# Patient Record
Sex: FEMALE | Race: WHITE
[De-identification: names, ages, dates, MRNs, and addresses within clinical notes are randomized per-mention and may not be internally consistent; named-entity substitution may affect disease eponyms.]

---

## 2020-08-27 ENCOUNTER — HOSPITAL ENCOUNTER (OUTPATIENT)
Dept: HOSPITAL 62 - RAD | Age: 85
End: 2020-08-27
Attending: FAMILY MEDICINE
Payer: MEDICARE

## 2020-08-27 DIAGNOSIS — M19.011: Primary | ICD-10-CM

## 2020-08-27 PROCEDURE — 64640 INJECTION TREATMENT OF NERVE: CPT

## 2020-08-27 NOTE — OPERATIVE REPORT
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


     


POSTOPERATIVE DIAGNOSIS:Osteoarthritis right shoulder M19.011





PROCEDURE: 1. Radiofrequency Ablation of Cutaneous branches of Scapular Nerve


                          2. Cutaneous branch Suprascapular Nerve


         3. Cutaneous branch Lateral Pectoral


DATE OF PROCEDURE: 8/27/20


ANESTHESIA: Local


COMPLICATIONS: None


CONSENT: A full description of the procedure was provided including benefits as 

well as possible complications. All questions were answered and informed consent

was given and signed. ASA guidelines for fasting were verified prior to 

sedation. 


PROCEDURE IN DETAIL


The patient was brought into the fluoroscopy suite and carefully assisted into 

the prone position on the fluoroscopy table and allowed to adjust to a position 

of comfort. A grounding pad was placed on the right thigh. The low back and 

buttocks were widely prepped with a chloraprep solution, allowed to air dry and 

draped in standard sterile surgical fashion. Local anesthesia was provided by 12

mL of 1 % lidocaine delivered with a 25 g needle.


 


PROCEDURE #1: Radiofrequency Ablation of Cutaneous branch of scapular nerve. 


A 17g 75mm radiofrequency introducer needle was placed to the planned anatomic 

target, guided with intermittent fluoroscopy with a perpendicular approach, to 

terminally place at the superior glenoid. The stylets were removed and the 

radiofrequency probes with a 2mm active tip were then inserted. Needle tip 

position of the probes were verified in the AP, oblique, and lateral views. At 

each site, the cutaneous nerve was stimulated at 2Hz to a maximum of 1-2volts 

determined to finalize safe needle and electrode placement. The patient was 

awake and responsive during this portion of the procedure. Each target was 

anesthetized with 2mL of 2 % Sensorcaine anesthesia for lesioning and then each 

target was lesioned at 80 degrees Celsius for 2 minutes and 30 seconds. Tissue 

impedences were noted to be between 250 and 500 Ohms.


PROCEDURE #2: Radiofrequency Ablation of Cutaneous branch of suprascapular nerve




A 17g 75mm radiofrequency introducer needle was placed to the planned anatomic 

target, guided with intermittent fluoroscopy with a perpendicular approach, to 

terminally place at the Lateral Glenoid tuberical. The stylets were removed and 

the radiofrequency probes with a 2mm active tip were then inserted. Needle tip 

position of the probes were verified in the AP, oblique, and lateral views. At 

each site, the cutaneous nerve was stimulated at 2Hz to a maximum of 1-2volts 

determined to finalize safe needle and electrode placement. The patient was 

awake and responsive during this portion of the procedure. Each target was 

anesthetized with 2mL of 2 % Sensorcaine anesthesia for lesioning and then each 

target was lesioned at 80 degrees Celsius for 2 minutes and 30 seconds. Tissue 

impedences were noted to be between 250 and 500 Ohms.


PROCEDURE #3 Radiofrequency Ablation of Cutaneous branch of lateral pectoral 

nerve


A 17g 75mm radiofrequency introducer needle was placed to the planned anatomic 

target, guided with intermittent fluoroscopy with a perpendicular approach, to 

terminally place at the coricoid process. The stylets were removed and the 

radiofrequency probes with a 2mm active tip were then inserted. Needle tip 

position of the probes were verified in the AP, oblique, and lateral views. At 

each site, the cutaneous nerve was stimulated at 2Hz to a maximum of 1-2volts 

determined to finalize safe needle and electrode placement. The patient was 

awake and responsive during this portion of the procedure. Each target was 

anesthetized with 2mL of 2 % Sensorcaine anesthesia for lesioning and then each 

target was lesioned at 80 degrees Celsius for 2 minutes and 30 seconds. Tissue 

impedences were noted to be between 250 and 500 Ohms.


POST PROCEDURE EVALUATION: The patient was comfortable in the recovery room. The

patient is aware that pain may worsen before remitting and 4  6 weeks may be 

required prior to the onset of pain relief. 


IMPRESSION: 


1. Technically successful right ablation of cutaneous branches of scapurar, 

subscapular and lateral pectoral nerves without complication.


2. RTC in 2 weeks.


3. Estimated Blood Loss: None


4. Fluoroscopy time: 30 seconds

## 2020-11-19 ENCOUNTER — HOSPITAL ENCOUNTER (OUTPATIENT)
Dept: HOSPITAL 62 - RAD | Age: 85
End: 2020-11-19
Attending: FAMILY MEDICINE
Payer: MEDICARE

## 2020-11-19 DIAGNOSIS — M19.012: Primary | ICD-10-CM

## 2020-11-19 PROCEDURE — 64640 INJECTION TREATMENT OF NERVE: CPT

## 2020-11-19 NOTE — OPERATIVE REPORT
PREOPERATIVE DIAGNOSIS: Osteoarthritis left shoulder M19.011


     


POSTOPERATIVE DIAGNOSIS:Osteoarthritis left shoulder M19.011





PROCEDURE: 1. Radiofrequency Ablation of Cutaneous branches of Scapular Nerve


                          2. Cutaneous branch Suprascapular Nerve


         3. Cutaneous branch Lateral Pectoral


DATE OF PROCEDURE: 11/19/2020


ANESTHESIA: Local


COMPLICATIONS: None


CONSENT: A full description of the procedure was provided including benefits as 

well as possible complications. All questions were answered and informed consent

was given and signed. ASA guidelines for fasting were verified prior to 

sedation. 


PROCEDURE IN DETAIL


The patient was brought into the fluoroscopy suite and carefully assisted into 

the prone position on the fluoroscopy table and allowed to adjust to a position 

of comfort. A grounding pad was placed on the right thigh. The low back and 

buttocks were widely prepped with a chloraprep solution, allowed to air dry and 

draped in standard sterile surgical fashion. Local anesthesia was provided by 12

mL of 1 % lidocaine delivered with a 25 g needle.


 


PROCEDURE #1: Radiofrequency Ablation of Cutaneous branch of scapular nerve. 


A 17g 75mm radiofrequency introducer needle was placed to the planned anatomic 

target, guided with intermittent fluoroscopy with a perpendicular approach, to 

terminally place at the superior glenoid. The stylets were removed and the 

radiofrequency probes with a 2mm active tip were then inserted. Needle tip 

position of the probes were verified in the AP, oblique, and lateral views. At 

each site, the cutaneous nerve was stimulated at 2Hz to a maximum of 1-2volts 

determined to finalize safe needle and electrode placement. The patient was 

awake and responsive during this portion of the procedure. Each target was 

anesthetized with 2mL of 2 % Sensorcaine anesthesia for lesioning and then each 

target was lesioned at 80 degrees Celsius for 2 minutes and 30 seconds. Tissue 

impedences were noted to be between 250 and 500 Ohms.


PROCEDURE #2: Radiofrequency Ablation of Cutaneous branch of suprascapular nerve




A 17g 75mm radiofrequency introducer needle was placed to the planned anatomic 

target, guided with intermittent fluoroscopy with a perpendicular approach, to 

terminally place at the Lateral Glenoid tuberical. The stylets were removed and 

the radiofrequency probes with a 2mm active tip were then inserted. Needle tip 

position of the probes were verified in the AP, oblique, and lateral views. At 

each site, the cutaneous nerve was stimulated at 2Hz to a maximum of 1-2volts 

determined to finalize safe needle and electrode placement. The patient was 

awake and responsive during this portion of the procedure. Each target was 

anesthetized with 2mL of 2 % Sensorcaine anesthesia for lesioning and then each 

target was lesioned at 80 degrees Celsius for 2 minutes and 30 seconds. Tissue 

impedences were noted to be between 250 and 500 Ohms.


PROCEDURE #3 Radiofrequency Ablation of Cutaneous branch of lateral pectoral 

nerve


A 17g 75mm radiofrequency introducer needle was placed to the planned anatomic 

target, guided with intermittent fluoroscopy with a perpendicular approach, to 

terminally place at the coricoid process. The stylets were removed and the 

radiofrequency probes with a 2mm active tip were then inserted. Needle tip 

position of the probes were verified in the AP, oblique, and lateral views. At 

each site, the cutaneous nerve was stimulated at 2Hz to a maximum of 1-2volts 

determined to finalize safe needle and electrode placement. The patient was 

awake and responsive during this portion of the procedure. Each target was 

anesthetized with 2mL of 2 % Sensorcaine anesthesia for lesioning and then each 

target was lesioned at 80 degrees Celsius for 2 minutes and 30 seconds. Tissue 

impedences were noted to be between 250 and 500 Ohms.


POST PROCEDURE EVALUATION: The patient was comfortable in the recovery room. The

patient is aware that pain may worsen before remitting and 4  6 weeks may be 

required prior to the onset of pain relief. 


IMPRESSION: 


1. Technically successful left ablation of cutaneous branches of scapurar, 

subscapular and lateral pectoral nerves without complication.


2. RTC in 2 weeks.


3. Estimated Blood Loss: None


4. Fluoroscopy time: 30 seconds